# Patient Record
Sex: FEMALE | Race: WHITE | Employment: STUDENT | URBAN - METROPOLITAN AREA
[De-identification: names, ages, dates, MRNs, and addresses within clinical notes are randomized per-mention and may not be internally consistent; named-entity substitution may affect disease eponyms.]

---

## 2017-04-21 ENCOUNTER — OFFICE VISIT (OUTPATIENT)
Dept: INTERNAL MEDICINE CLINIC | Facility: CLINIC | Age: 22
End: 2017-04-21

## 2017-04-21 ENCOUNTER — LAB ENCOUNTER (OUTPATIENT)
Dept: LAB | Age: 22
End: 2017-04-21
Attending: INTERNAL MEDICINE
Payer: COMMERCIAL

## 2017-04-21 VITALS
SYSTOLIC BLOOD PRESSURE: 106 MMHG | DIASTOLIC BLOOD PRESSURE: 64 MMHG | BODY MASS INDEX: 18.85 KG/M2 | HEART RATE: 90 BPM | RESPIRATION RATE: 18 BRPM | OXYGEN SATURATION: 99 % | TEMPERATURE: 98 F | HEIGHT: 68.5 IN | WEIGHT: 125.81 LBS

## 2017-04-21 DIAGNOSIS — R53.83 OTHER FATIGUE: ICD-10-CM

## 2017-04-21 DIAGNOSIS — Z00.00 PHYSICAL EXAM, ANNUAL: ICD-10-CM

## 2017-04-21 DIAGNOSIS — L70.9 ACNE, UNSPECIFIED ACNE TYPE: ICD-10-CM

## 2017-04-21 DIAGNOSIS — Z01.419 VISIT FOR GYNECOLOGIC EXAMINATION: ICD-10-CM

## 2017-04-21 DIAGNOSIS — K59.00 CONSTIPATION, UNSPECIFIED CONSTIPATION TYPE: ICD-10-CM

## 2017-04-21 DIAGNOSIS — Z00.00 PHYSICAL EXAM, ANNUAL: Primary | ICD-10-CM

## 2017-04-21 DIAGNOSIS — D22.9 MULTIPLE NEVI: ICD-10-CM

## 2017-04-21 PROCEDURE — 80053 COMPREHEN METABOLIC PANEL: CPT

## 2017-04-21 PROCEDURE — 90715 TDAP VACCINE 7 YRS/> IM: CPT | Performed by: INTERNAL MEDICINE

## 2017-04-21 PROCEDURE — 90471 IMMUNIZATION ADMIN: CPT | Performed by: INTERNAL MEDICINE

## 2017-04-21 PROCEDURE — 36415 COLL VENOUS BLD VENIPUNCTURE: CPT

## 2017-04-21 PROCEDURE — 85025 COMPLETE CBC W/AUTO DIFF WBC: CPT

## 2017-04-21 PROCEDURE — 99395 PREV VISIT EST AGE 18-39: CPT | Performed by: INTERNAL MEDICINE

## 2017-04-21 PROCEDURE — 84443 ASSAY THYROID STIM HORMONE: CPT

## 2017-04-21 NOTE — PROGRESS NOTES
Luis Cintron is a 19year old female.     HPI:   Patient presents with:  Physical      20 y/o F here for physical exam; she reports acne has exacerbated, and she is using OTC oil-based cleansing agent only; she requests dermatology consultation for acne e bruising  Integumentary:  Negative for pruritus and rash  Neurological:  Negative for gait disturbance; negative for paresthesias   All other review of systems are negative.         PHYSICAL EXAM:   Blood pressure 106/64, pulse 90, temperature 97.9 °F (36.6 MD

## 2017-04-21 NOTE — PROGRESS NOTES
Pt presents for tdap injection. Full name and  verified Tdap 0.5ml administered IM to left deltoid. Tolerated well.

## 2017-04-25 ENCOUNTER — TELEPHONE (OUTPATIENT)
Dept: INTERNAL MEDICINE CLINIC | Facility: CLINIC | Age: 22
End: 2017-04-25

## 2017-05-19 ENCOUNTER — LAB REQUISITION (OUTPATIENT)
Dept: LAB | Facility: HOSPITAL | Age: 22
End: 2017-05-19
Payer: COMMERCIAL

## 2017-05-19 DIAGNOSIS — Z12.4 ENCOUNTER FOR SCREENING FOR MALIGNANT NEOPLASM OF CERVIX: ICD-10-CM

## 2017-05-19 DIAGNOSIS — Z11.3 ENCOUNTER FOR SCREENING FOR INFECTIONS WITH PREDOMINANTLY SEXUAL MODE OF TRANSMISSION: ICD-10-CM

## 2017-05-19 PROCEDURE — 88175 CYTOPATH C/V AUTO FLUID REDO: CPT | Performed by: OBSTETRICS & GYNECOLOGY

## 2017-05-19 PROCEDURE — 87491 CHLMYD TRACH DNA AMP PROBE: CPT | Performed by: OBSTETRICS & GYNECOLOGY

## 2017-05-19 PROCEDURE — 87591 N.GONORRHOEAE DNA AMP PROB: CPT

## 2017-05-19 PROCEDURE — 87491 CHLMYD TRACH DNA AMP PROBE: CPT

## 2017-05-19 PROCEDURE — 87591 N.GONORRHOEAE DNA AMP PROB: CPT | Performed by: OBSTETRICS & GYNECOLOGY

## 2017-06-12 ENCOUNTER — OFFICE VISIT (OUTPATIENT)
Dept: DERMATOLOGY CLINIC | Facility: CLINIC | Age: 22
End: 2017-06-12

## 2017-06-12 DIAGNOSIS — D23.5 BENIGN NEOPLASM OF SKIN OF TRUNK, EXCEPT SCROTUM: Primary | ICD-10-CM

## 2017-06-12 DIAGNOSIS — D18.01 HEMANGIOMA OF SKIN AND SUBCUTANEOUS TISSUE: ICD-10-CM

## 2017-06-12 DIAGNOSIS — D23.30 BENIGN NEOPLASM OF SKIN OF FACE: ICD-10-CM

## 2017-06-12 DIAGNOSIS — L70.0 ACNE VULGARIS: ICD-10-CM

## 2017-06-12 DIAGNOSIS — D23.60 BENIGN NEOPLASM OF SKIN OF UPPER LIMB, INCLUDING SHOULDER, UNSPECIFIED LATERALITY: ICD-10-CM

## 2017-06-12 DIAGNOSIS — D23.70 BENIGN NEOPLASM OF SKIN OF LOWER LIMB, INCLUDING HIP, UNSPECIFIED LATERALITY: ICD-10-CM

## 2017-06-12 DIAGNOSIS — D48.5 NEOPLASM OF UNCERTAIN BEHAVIOR OF SKIN: ICD-10-CM

## 2017-06-12 DIAGNOSIS — D23.4 BENIGN NEOPLASM OF SCALP AND SKIN OF NECK: ICD-10-CM

## 2017-06-12 PROCEDURE — 99203 OFFICE O/P NEW LOW 30 MIN: CPT | Performed by: DERMATOLOGY

## 2017-06-12 PROCEDURE — 88305 TISSUE EXAM BY PATHOLOGIST: CPT | Performed by: DERMATOLOGY

## 2017-06-12 PROCEDURE — 11100 BIOPSY OF SKIN LESION: CPT | Performed by: DERMATOLOGY

## 2017-06-12 RX ORDER — TRETINOIN 0.025 %
1 CREAM (GRAM) TOPICAL NIGHTLY
Qty: 30 G | Refills: 3 | Status: SHIPPED | OUTPATIENT
Start: 2017-06-12 | End: 2018-05-09

## 2017-06-12 RX ORDER — CLINDAMYCIN PHOSPHATE AND BENZOYL PEROXIDE 10; 50 MG/G; MG/G
1 GEL TOPICAL DAILY
Qty: 1 TUBE | Refills: 3 | Status: SHIPPED | OUTPATIENT
Start: 2017-06-12 | End: 2018-01-16

## 2017-06-12 RX ORDER — MINOCYCLINE HYDROCHLORIDE 100 MG/1
100 CAPSULE ORAL DAILY
Qty: 60 CAPSULE | Refills: 2 | Status: SHIPPED | OUTPATIENT
Start: 2017-06-12 | End: 2018-05-09

## 2017-06-12 NOTE — PROGRESS NOTES
HPI:     Chief Complaint     Moles;  Acne        HPI     Moles    Additional comments: 1st time LSS with referral for eval of multiple nevi (grandmother +)           Acne    Additional comments: with referral for tx of  acne face back and chest X 10 years, Comment:Other reaction(s): joint swelling             Other reaction(s): Hives / Skin Rash  Clavulanic Acid             Comment:Other reaction(s): Hives / Skin Rash  Penicillins                 Comment:Other reaction(s): Itching    Past Medical History   D asymmetry noted on dermoscopy. –Patient was concerned about a lesion on the outer upper  left arm. It is a 4 mm macule with slightly darker central pigment.   Appears symmetrical on dermoscopy  -there are scattered blotchy brown macules on face, trunk and encounter.          6/12/2017  Preethi Stoner

## 2017-08-16 ENCOUNTER — OFFICE VISIT (OUTPATIENT)
Dept: INTERNAL MEDICINE CLINIC | Facility: CLINIC | Age: 22
End: 2017-08-16

## 2017-08-16 VITALS
TEMPERATURE: 99 F | SYSTOLIC BLOOD PRESSURE: 94 MMHG | WEIGHT: 138.63 LBS | DIASTOLIC BLOOD PRESSURE: 66 MMHG | HEIGHT: 68.5 IN | HEART RATE: 64 BPM | BODY MASS INDEX: 20.77 KG/M2

## 2017-08-16 DIAGNOSIS — H10.9 CONJUNCTIVITIS OF LEFT EYE, UNSPECIFIED CONJUNCTIVITIS TYPE: Primary | ICD-10-CM

## 2017-08-16 PROCEDURE — 99212 OFFICE O/P EST SF 10 MIN: CPT | Performed by: INTERNAL MEDICINE

## 2017-08-16 PROCEDURE — 99213 OFFICE O/P EST LOW 20 MIN: CPT | Performed by: INTERNAL MEDICINE

## 2017-08-16 RX ORDER — GENTAMICIN SULFATE 3 MG/ML
1 SOLUTION/ DROPS OPHTHALMIC EVERY 4 HOURS
Qty: 5 ML | Refills: 0 | Status: SHIPPED | OUTPATIENT
Start: 2017-08-16 | End: 2018-01-16 | Stop reason: ALTCHOICE

## 2017-08-16 NOTE — PROGRESS NOTES
Sergio Villarreal is a 23year old female.     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         PHYSICAL EXAM:   Blood pressure 94/66, pulse 64, temperature 98.9 °F (37.2 °C), temperature source Oral

## 2018-01-15 ENCOUNTER — TELEPHONE (OUTPATIENT)
Dept: INTERNAL MEDICINE CLINIC | Facility: CLINIC | Age: 23
End: 2018-01-15

## 2018-01-15 NOTE — TELEPHONE ENCOUNTER
Please advise - called patient who states she vomited once yesterday with a littl eblood, has a migraine which is better now, achy all over , denies fever - did not take anything for her migraine - to DR. SHETH

## 2018-01-15 NOTE — TELEPHONE ENCOUNTER
Pt. Has not been feeling well since yesterday. She was vomiting yesterday, migraines, achey allover. Asking to see Dr. Jacinto Doherty. Today. Please advise. She can be reached at [phone].

## 2018-01-16 ENCOUNTER — OFFICE VISIT (OUTPATIENT)
Dept: INTERNAL MEDICINE CLINIC | Facility: CLINIC | Age: 23
End: 2018-01-16

## 2018-01-16 ENCOUNTER — LAB ENCOUNTER (OUTPATIENT)
Dept: LAB | Age: 23
End: 2018-01-16
Attending: INTERNAL MEDICINE
Payer: COMMERCIAL

## 2018-01-16 ENCOUNTER — TELEPHONE (OUTPATIENT)
Dept: INTERNAL MEDICINE CLINIC | Facility: CLINIC | Age: 23
End: 2018-01-16

## 2018-01-16 VITALS
TEMPERATURE: 98 F | BODY MASS INDEX: 21.24 KG/M2 | SYSTOLIC BLOOD PRESSURE: 90 MMHG | HEART RATE: 80 BPM | DIASTOLIC BLOOD PRESSURE: 64 MMHG | WEIGHT: 141.81 LBS | HEIGHT: 68.5 IN | OXYGEN SATURATION: 99 %

## 2018-01-16 DIAGNOSIS — K92.0 HEMATEMESIS, PRESENCE OF NAUSEA NOT SPECIFIED: ICD-10-CM

## 2018-01-16 DIAGNOSIS — K92.0 HEMATEMESIS, PRESENCE OF NAUSEA NOT SPECIFIED: Primary | ICD-10-CM

## 2018-01-16 LAB
ALBUMIN SERPL BCP-MCNC: 3.9 G/DL (ref 3.5–4.8)
ALBUMIN/GLOB SERPL: 1.3 {RATIO} (ref 1–2)
ALP SERPL-CCNC: 40 U/L (ref 32–100)
ALT SERPL-CCNC: 16 U/L (ref 14–54)
ANION GAP SERPL CALC-SCNC: 9 MMOL/L (ref 0–18)
AST SERPL-CCNC: 20 U/L (ref 15–41)
BASOPHILS # BLD: 0 K/UL (ref 0–0.2)
BASOPHILS NFR BLD: 0 %
BILIRUB SERPL-MCNC: 0.8 MG/DL (ref 0.3–1.2)
BUN SERPL-MCNC: 7 MG/DL (ref 8–20)
BUN/CREAT SERPL: 10.4 (ref 10–20)
CALCIUM SERPL-MCNC: 9 MG/DL (ref 8.5–10.5)
CHLORIDE SERPL-SCNC: 103 MMOL/L (ref 95–110)
CO2 SERPL-SCNC: 26 MMOL/L (ref 22–32)
CREAT SERPL-MCNC: 0.67 MG/DL (ref 0.5–1.5)
EOSINOPHIL # BLD: 0.1 K/UL (ref 0–0.7)
EOSINOPHIL NFR BLD: 1 %
ERYTHROCYTE [DISTWIDTH] IN BLOOD BY AUTOMATED COUNT: 12.6 % (ref 11–15)
GLOBULIN PLAS-MCNC: 3.1 G/DL (ref 2.5–3.7)
GLUCOSE SERPL-MCNC: 91 MG/DL (ref 70–99)
HCT VFR BLD AUTO: 38.3 % (ref 35–48)
HGB BLD-MCNC: 13.1 G/DL (ref 12–16)
LYMPHOCYTES # BLD: 1.3 K/UL (ref 1–4)
LYMPHOCYTES NFR BLD: 24 %
MCH RBC QN AUTO: 30.4 PG (ref 27–32)
MCHC RBC AUTO-ENTMCNC: 34.1 G/DL (ref 32–37)
MCV RBC AUTO: 89.1 FL (ref 80–100)
MONOCYTES # BLD: 0.6 K/UL (ref 0–1)
MONOCYTES NFR BLD: 11 %
NEUTROPHILS # BLD AUTO: 3.4 K/UL (ref 1.8–7.7)
NEUTROPHILS NFR BLD: 64 %
OSMOLALITY UR CALC.SUM OF ELEC: 284 MOSM/KG (ref 275–295)
PLATELET # BLD AUTO: 231 K/UL (ref 140–400)
PMV BLD AUTO: 7.4 FL (ref 7.4–10.3)
POTASSIUM SERPL-SCNC: 3.7 MMOL/L (ref 3.3–5.1)
PROT SERPL-MCNC: 7 G/DL (ref 5.9–8.4)
RBC # BLD AUTO: 4.3 M/UL (ref 3.7–5.4)
SODIUM SERPL-SCNC: 138 MMOL/L (ref 136–144)
WBC # BLD AUTO: 5.3 K/UL (ref 4–11)

## 2018-01-16 PROCEDURE — 80053 COMPREHEN METABOLIC PANEL: CPT

## 2018-01-16 PROCEDURE — 99214 OFFICE O/P EST MOD 30 MIN: CPT | Performed by: INTERNAL MEDICINE

## 2018-01-16 PROCEDURE — 36415 COLL VENOUS BLD VENIPUNCTURE: CPT

## 2018-01-16 PROCEDURE — 85025 COMPLETE CBC W/AUTO DIFF WBC: CPT

## 2018-01-16 PROCEDURE — 99212 OFFICE O/P EST SF 10 MIN: CPT | Performed by: INTERNAL MEDICINE

## 2018-01-16 RX ORDER — MINOCYCLINE HYDROCHLORIDE 100 MG/1
100 CAPSULE ORAL DAILY
Qty: 60 CAPSULE | Refills: 2 | Status: CANCELLED | OUTPATIENT
Start: 2018-01-16

## 2018-01-16 RX ORDER — CLINDAMYCIN PHOSPHATE AND BENZOYL PEROXIDE 10; 50 MG/G; MG/G
1 GEL TOPICAL DAILY
Qty: 1 TUBE | Refills: 3 | Status: SHIPPED | OUTPATIENT
Start: 2018-01-16 | End: 2018-05-09

## 2018-01-16 RX ORDER — OMEPRAZOLE 40 MG/1
40 CAPSULE, DELAYED RELEASE ORAL DAILY
Qty: 90 CAPSULE | Refills: 3 | Status: SHIPPED | OUTPATIENT
Start: 2018-01-16 | End: 2019-01-11

## 2018-01-16 NOTE — PROGRESS NOTES
Julian Winn is a 23year old female. HPI:   Patient presents with:  Vomiting: Pt vomited once on sunday with blood. Medication Request: refills pended. Per nursing documentation. Patient noted that Sunday she developed epigastric pain.   She the of urination  NEURO:  denies headaches or dizziness    EXAM:   BP 90/64 (BP Location: Left arm, Patient Position: Sitting, Cuff Size: adult)   Pulse 80   Temp 98.1 °F (36.7 °C) (Oral)   Ht 5' 8.5\" (1.74 m)   Wt 141 lb 12.8 oz (64.3 kg)   LMP 01/15/2018 (E weeks.     Deja Mills MD  1/16/2018  10:04 AM

## 2018-04-04 ENCOUNTER — LAB REQUISITION (OUTPATIENT)
Dept: LAB | Facility: HOSPITAL | Age: 23
End: 2018-04-04
Payer: COMMERCIAL

## 2018-04-04 DIAGNOSIS — Z11.3 ENCOUNTER FOR SCREENING FOR INFECTIONS WITH PREDOMINANTLY SEXUAL MODE OF TRANSMISSION: ICD-10-CM

## 2018-04-04 PROCEDURE — 87491 CHLMYD TRACH DNA AMP PROBE: CPT | Performed by: OBSTETRICS & GYNECOLOGY

## 2018-04-04 PROCEDURE — 87591 N.GONORRHOEAE DNA AMP PROB: CPT | Performed by: OBSTETRICS & GYNECOLOGY

## 2018-05-09 RX ORDER — MINOCYCLINE HYDROCHLORIDE 100 MG/1
100 CAPSULE ORAL DAILY
Qty: 60 CAPSULE | Refills: 2 | Status: SHIPPED | OUTPATIENT
Start: 2018-05-09 | End: 2019-03-05

## 2018-05-09 RX ORDER — CLINDAMYCIN PHOSPHATE AND BENZOYL PEROXIDE 10; 50 MG/G; MG/G
1 GEL TOPICAL DAILY
Qty: 1 TUBE | Refills: 3 | Status: SHIPPED | OUTPATIENT
Start: 2018-05-09 | End: 2019-03-05

## 2018-05-09 RX ORDER — TRETINOIN 0.025 %
1 CREAM (GRAM) TOPICAL NIGHTLY
Qty: 30 G | Refills: 3 | Status: SHIPPED | OUTPATIENT
Start: 2018-05-09 | End: 2019-03-05

## 2018-12-17 ENCOUNTER — TELEPHONE (OUTPATIENT)
Dept: INTERNAL MEDICINE CLINIC | Facility: CLINIC | Age: 23
End: 2018-12-17

## 2018-12-17 NOTE — TELEPHONE ENCOUNTER
As FYI to DR. SHETH - called patient who has trinh soon.  RN instructed her to call pediatrician for her immunization records - fax number to our office given

## 2018-12-17 NOTE — TELEPHONE ENCOUNTER
Pt is calling to check on which immunizations she has and has not had recently and which need to be renewed if any. 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 MMR vaccine x2 OR lab test showing immunity.    2. Chicken Pocks OR lab hannah

## 2019-01-07 ENCOUNTER — TELEPHONE (OUTPATIENT)
Dept: INTERNAL MEDICINE CLINIC | Facility: CLINIC | Age: 24
End: 2019-01-07

## 2019-01-07 DIAGNOSIS — Z78.9 MEASLES, MUMPS, RUBELLA (MMR) VACCINATION STATUS UNKNOWN: ICD-10-CM

## 2019-01-07 DIAGNOSIS — Z78.9 VARICELLA VACCINATION STATUS UNKNOWN: ICD-10-CM

## 2019-01-07 DIAGNOSIS — Z78.9 HEPATITIS B VACCINATION STATUS UNKNOWN: ICD-10-CM

## 2019-01-07 DIAGNOSIS — Z00.00 PHYSICAL EXAM, ANNUAL: Primary | ICD-10-CM

## 2019-01-07 NOTE — TELEPHONE ENCOUNTER
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

## 2019-01-07 NOTE — TELEPHONE ENCOUNTER
OK; I have placed orders for MMR titer, varicella titer, Hep B surface antibody; she may see nurse for TB skin test; if she needs 2-step PPD, she should present 1 week later for second PPD placement; does she have form that needs completion by physician?;

## 2019-01-08 NOTE — TELEPHONE ENCOUNTER
Spoke with patient to relay MD message and instructions. Patient verbalizes understanding of message and instructions, and was transferred to  to make a nurse visit appointment for TB test and flu shot.  Patient does not need a form signed from MD

## 2019-01-09 ENCOUNTER — NURSE ONLY (OUTPATIENT)
Dept: INTERNAL MEDICINE CLINIC | Facility: CLINIC | Age: 24
End: 2019-01-09

## 2019-01-09 DIAGNOSIS — Z23 NEED FOR TUBERCULOSIS VACCINATION: Primary | ICD-10-CM

## 2019-01-09 DIAGNOSIS — Z11.1 SCREENING FOR TUBERCULOSIS: ICD-10-CM

## 2019-01-09 LAB — INDURATION (): 0 MM (ref 0–11)

## 2019-01-09 PROCEDURE — 90686 IIV4 VACC NO PRSV 0.5 ML IM: CPT | Performed by: INTERNAL MEDICINE

## 2019-01-09 PROCEDURE — 90471 IMMUNIZATION ADMIN: CPT | Performed by: INTERNAL MEDICINE

## 2019-01-09 PROCEDURE — 86580 TB INTRADERMAL TEST: CPT | Performed by: INTERNAL MEDICINE

## 2019-01-09 NOTE — PROGRESS NOTES
TB skin test and flu written orders found in 19 telephone encounter. Verified name and . Patient tolerated well. Patient aware to come back in 48-72 hours for reading.

## 2019-01-09 NOTE — TELEPHONE ENCOUNTER
Spoke to patient to relay MD message.  Patient reports that she will be volunteering at [facility]. Per MD message, patient advised that a drug screening, as well as a quantiferon gold assay if needed, should be done through [facility] at the hospitals

## 2019-01-09 NOTE — TELEPHONE ENCOUNTER
it is an excessive requirement to require BOTH skin test for TB as well as quantiferon gold assay; quantiferon gold is not routinely done for screening due to cost; if she is volunteering at hospital, advise pt [facility]

## 2019-01-11 ENCOUNTER — APPOINTMENT (OUTPATIENT)
Dept: LAB | Age: 24
End: 2019-01-11
Attending: INTERNAL MEDICINE
Payer: COMMERCIAL

## 2019-01-11 ENCOUNTER — NURSE ONLY (OUTPATIENT)
Dept: INTERNAL MEDICINE CLINIC | Facility: CLINIC | Age: 24
End: 2019-01-11

## 2019-01-11 DIAGNOSIS — Z00.00 PHYSICAL EXAM, ANNUAL: ICD-10-CM

## 2019-01-11 DIAGNOSIS — Z78.9 VARICELLA VACCINATION STATUS UNKNOWN: ICD-10-CM

## 2019-01-11 DIAGNOSIS — Z78.9 MEASLES, MUMPS, RUBELLA (MMR) VACCINATION STATUS UNKNOWN: ICD-10-CM

## 2019-01-11 PROCEDURE — 86765 RUBEOLA ANTIBODY: CPT

## 2019-01-11 PROCEDURE — 36415 COLL VENOUS BLD VENIPUNCTURE: CPT

## 2019-01-11 PROCEDURE — 86787 VARICELLA-ZOSTER ANTIBODY: CPT

## 2019-01-14 LAB
MEV IGG SER-ACNC: >300 AU/ML (ref 30–?)
VZV IGG SER IA-ACNC: 1693 (ref 165–?)

## 2019-01-16 ENCOUNTER — NURSE ONLY (OUTPATIENT)
Dept: INTERNAL MEDICINE CLINIC | Facility: CLINIC | Age: 24
End: 2019-01-16

## 2019-01-16 LAB — INDURATION (): 0 MM (ref 0–11)

## 2019-01-16 PROCEDURE — 86580 TB INTRADERMAL TEST: CPT | Performed by: INTERNAL MEDICINE

## 2019-01-18 ENCOUNTER — NURSE ONLY (OUTPATIENT)
Dept: INTERNAL MEDICINE CLINIC | Facility: CLINIC | Age: 24
End: 2019-01-18

## 2019-01-18 NOTE — PROGRESS NOTES
Patient here for TB test read. Patient name and  verified. TB read negative, confirmed with Dr. Lisa Coleman.

## 2019-01-25 NOTE — TELEPHONE ENCOUNTER
TB results faxed to [facility] at  per her request. Patient notified and she verbalized understanding. Fax confirmation received.

## 2019-03-05 ENCOUNTER — OFFICE VISIT (OUTPATIENT)
Dept: INTERNAL MEDICINE CLINIC | Facility: CLINIC | Age: 24
End: 2019-03-05

## 2019-03-05 VITALS
HEIGHT: 68.5 IN | HEART RATE: 82 BPM | BODY MASS INDEX: 22.92 KG/M2 | DIASTOLIC BLOOD PRESSURE: 68 MMHG | OXYGEN SATURATION: 99 % | TEMPERATURE: 98 F | SYSTOLIC BLOOD PRESSURE: 92 MMHG | WEIGHT: 153 LBS

## 2019-03-05 DIAGNOSIS — J32.8 OTHER CHRONIC SINUSITIS: Primary | ICD-10-CM

## 2019-03-05 DIAGNOSIS — L70.0 ACNE VULGARIS: ICD-10-CM

## 2019-03-05 PROCEDURE — 99213 OFFICE O/P EST LOW 20 MIN: CPT | Performed by: INTERNAL MEDICINE

## 2019-03-05 RX ORDER — CLINDAMYCIN PHOSPHATE AND BENZOYL PEROXIDE 10; 50 MG/G; MG/G
1 GEL TOPICAL DAILY
Qty: 1 TUBE | Refills: 3 | Status: SHIPPED | OUTPATIENT
Start: 2019-03-05 | End: 2020-02-05 | Stop reason: ALTCHOICE

## 2019-03-05 RX ORDER — TRETINOIN 0.025 %
1 CREAM (GRAM) TOPICAL NIGHTLY
Qty: 30 G | Refills: 3 | Status: SHIPPED | OUTPATIENT
Start: 2019-03-05 | End: 2020-02-05 | Stop reason: ALTCHOICE

## 2019-03-05 RX ORDER — MINOCYCLINE HYDROCHLORIDE 100 MG/1
100 CAPSULE ORAL DAILY
Qty: 60 CAPSULE | Refills: 2 | Status: SHIPPED | OUTPATIENT
Start: 2019-03-05 | End: 2020-02-05 | Stop reason: ALTCHOICE

## 2019-03-05 NOTE — PROGRESS NOTES
HPI:    Patient ID: Justyn Gramajo is a 20year old female. Patient presents with complaints of bilateral ear fullness, left greater than right that started approximately 7-10 days ago. She also notes sinus and nasal congestion.   She denies any fevers External ear normal.   Nose: Right sinus exhibits no maxillary sinus tenderness and no frontal sinus tenderness. Left sinus exhibits no maxillary sinus tenderness and no frontal sinus tenderness.    Mouth/Throat: Oropharynx is clear and moist.   Neck: Neck

## 2019-08-05 ENCOUNTER — OFFICE VISIT (OUTPATIENT)
Dept: INTERNAL MEDICINE CLINIC | Facility: CLINIC | Age: 24
End: 2019-08-05
Payer: COMMERCIAL

## 2019-08-05 VITALS
TEMPERATURE: 98 F | HEIGHT: 68.5 IN | OXYGEN SATURATION: 99 % | WEIGHT: 154 LBS | BODY MASS INDEX: 23.07 KG/M2 | SYSTOLIC BLOOD PRESSURE: 100 MMHG | HEART RATE: 95 BPM | DIASTOLIC BLOOD PRESSURE: 76 MMHG

## 2019-08-05 DIAGNOSIS — B34.9 VIRAL SYNDROME: Primary | ICD-10-CM

## 2019-08-05 LAB
CONTROL LINE PRESENT WITH A CLEAR BACKGROUND (YES/NO): YES YES/NO
KIT LOT #: NORMAL NUMERIC
STREP GRP A CUL-SCR: NEGATIVE

## 2019-08-05 PROCEDURE — 99212 OFFICE O/P EST SF 10 MIN: CPT | Performed by: INTERNAL MEDICINE

## 2019-08-05 PROCEDURE — 87880 STREP A ASSAY W/OPTIC: CPT | Performed by: INTERNAL MEDICINE

## 2019-08-05 PROCEDURE — 99213 OFFICE O/P EST LOW 20 MIN: CPT | Performed by: INTERNAL MEDICINE

## 2019-08-05 NOTE — PROGRESS NOTES
Loreta Patel is a 30year old female. HPI:   Patient presents with:  Pharyngitis: body aches, chills x 4 days - sister dx wit Mono      As per nursing documentation. Patient states her sister was diagnosed with mono July 10.   She feels that her sister Voices no chest pain on exertion or shortness of breath  GI:   Voices no abdominal pain or changes of bowels   :Viices no urning or frequency of urination.   NEURO: She reports no focal symptoms per    EXAM:   /76   Pulse 95   Temp 98.3 °F (36.8 °C)

## 2020-01-06 ENCOUNTER — TELEPHONE (OUTPATIENT)
Dept: INTERNAL MEDICINE CLINIC | Facility: CLINIC | Age: 25
End: 2020-01-06

## 2020-01-06 RX ORDER — SULFAMETHOXAZOLE AND TRIMETHOPRIM 800; 160 MG/1; MG/1
1 TABLET ORAL 2 TIMES DAILY
Qty: 14 TABLET | Refills: 0 | Status: SHIPPED | OUTPATIENT
Start: 2020-01-06 | End: 2020-02-05 | Stop reason: ALTCHOICE

## 2020-01-06 NOTE — TELEPHONE ENCOUNTER
Spoke to patient. She states she has had symptoms for about 24 hours. She reports frequency, urgency, burning, and fever. Denies blood in urine and denies back pain.   I asked what her temp is and she stated she hadn't taken it, she just has been sweaty a

## 2020-01-06 NOTE — TELEPHONE ENCOUNTER
Imp- dysuria;  Rec- empiric Bactrim DS one tab po BID #14; ERx sent to [facility] in [place]; please call pt

## 2020-01-06 NOTE — TELEPHONE ENCOUNTER
Patient calling for antibiotic. Has UTI. Has had in them in the past. Feeling of frequent, urgency, burning and fever. Drinking water and cranberry juice. Not relieving discomfort. Has been lasting for 24 hours not getting any better.     [facility].

## 2020-02-05 ENCOUNTER — APPOINTMENT (OUTPATIENT)
Dept: LAB | Age: 25
End: 2020-02-05
Attending: INTERNAL MEDICINE
Payer: COMMERCIAL

## 2020-02-05 ENCOUNTER — OFFICE VISIT (OUTPATIENT)
Dept: INTERNAL MEDICINE CLINIC | Facility: CLINIC | Age: 25
End: 2020-02-05
Payer: COMMERCIAL

## 2020-02-05 VITALS
WEIGHT: 146 LBS | BODY MASS INDEX: 21.87 KG/M2 | HEART RATE: 77 BPM | OXYGEN SATURATION: 97 % | DIASTOLIC BLOOD PRESSURE: 68 MMHG | HEIGHT: 68.5 IN | TEMPERATURE: 98 F | SYSTOLIC BLOOD PRESSURE: 112 MMHG

## 2020-02-05 DIAGNOSIS — R53.83 OTHER FATIGUE: ICD-10-CM

## 2020-02-05 DIAGNOSIS — F41.9 ANXIETY: ICD-10-CM

## 2020-02-05 DIAGNOSIS — L70.9 ACNE, UNSPECIFIED ACNE TYPE: Primary | ICD-10-CM

## 2020-02-05 LAB
ALBUMIN SERPL-MCNC: 4.3 G/DL (ref 3.4–5)
ALBUMIN/GLOB SERPL: 1 {RATIO} (ref 1–2)
ALP LIVER SERPL-CCNC: 72 U/L (ref 37–98)
ALT SERPL-CCNC: 19 U/L (ref 13–56)
ANION GAP SERPL CALC-SCNC: 5 MMOL/L (ref 0–18)
AST SERPL-CCNC: 24 U/L (ref 15–37)
BILIRUB SERPL-MCNC: 0.3 MG/DL (ref 0.1–2)
BUN BLD-MCNC: 8 MG/DL (ref 7–18)
BUN/CREAT SERPL: 11.8 (ref 10–20)
CALCIUM BLD-MCNC: 9.9 MG/DL (ref 8.5–10.1)
CHLORIDE SERPL-SCNC: 105 MMOL/L (ref 98–112)
CO2 SERPL-SCNC: 29 MMOL/L (ref 21–32)
CREAT BLD-MCNC: 0.68 MG/DL (ref 0.55–1.02)
GLOBULIN PLAS-MCNC: 4.3 G/DL (ref 2.8–4.4)
GLUCOSE BLD-MCNC: 73 MG/DL (ref 70–99)
M PROTEIN MFR SERPL ELPH: 8.6 G/DL (ref 6.4–8.2)
OSMOLALITY SERPL CALC.SUM OF ELEC: 285 MOSM/KG (ref 275–295)
PATIENT FASTING Y/N/NP: NO
POTASSIUM SERPL-SCNC: 4 MMOL/L (ref 3.5–5.1)
SODIUM SERPL-SCNC: 139 MMOL/L (ref 136–145)

## 2020-02-05 PROCEDURE — 36415 COLL VENOUS BLD VENIPUNCTURE: CPT

## 2020-02-05 PROCEDURE — 99212 OFFICE O/P EST SF 10 MIN: CPT | Performed by: INTERNAL MEDICINE

## 2020-02-05 PROCEDURE — 99214 OFFICE O/P EST MOD 30 MIN: CPT | Performed by: INTERNAL MEDICINE

## 2020-02-05 PROCEDURE — 80053 COMPREHEN METABOLIC PANEL: CPT

## 2020-02-05 RX ORDER — ALPRAZOLAM 0.25 MG/1
0.25 TABLET ORAL 3 TIMES DAILY PRN
Qty: 42 TABLET | Refills: 2 | Status: SHIPPED | OUTPATIENT
Start: 2020-02-05

## 2020-02-05 RX ORDER — SPIRONOLACTONE 25 MG/1
25 TABLET ORAL DAILY
Qty: 30 TABLET | Refills: 5 | Status: SHIPPED | OUTPATIENT
Start: 2020-02-05 | End: 2020-07-02

## 2020-02-05 NOTE — PROGRESS NOTES
Angeles Euceda is a 30year old female.     HPI:   Patient presents with:  Checkup: Would like to try spironolactone for her face       31 y/o F with acne here for F/U; Uses coconut oil to face; does not use any antibiotic washes; has not responded to benzo Skin Rash  Penicillins                 Comment:Other reaction(s): Itching              ROS:   Constitutional: no weight loss; no fatigue  Cardiovascular:  Negative for chest pain; negative palpitations  Respiratory:  Negative for cough, dyspnea and wheezin vegetarian    History of DVT (deep venous thrombosis)  Was on coumadin from 10/11- 4/12 for DVT RLE; seen by hematologist Dr Pattie Chi  positive result per pt; pt advised she can no longer take OCP    Protein C deficiency  per pt; lab

## 2020-07-02 ENCOUNTER — TELEPHONE (OUTPATIENT)
Dept: INTERNAL MEDICINE CLINIC | Facility: CLINIC | Age: 25
End: 2020-07-02

## 2020-07-02 DIAGNOSIS — Z79.899 HIGH RISK MEDICATION USE: ICD-10-CM

## 2020-07-02 DIAGNOSIS — L70.0 ACNE VULGARIS: Primary | ICD-10-CM

## 2020-07-02 RX ORDER — SPIRONOLACTONE 25 MG/1
25 TABLET ORAL 2 TIMES DAILY
Qty: 60 TABLET | Refills: 5 | Status: SHIPPED | OUTPATIENT
Start: 2020-07-02

## 2020-07-02 NOTE — TELEPHONE ENCOUNTER
Left message for pt to call back. To Dr. Rory Swain - see request below. Pt was to return in 3 months per 2/5/20 office visit note. No appt scheduled at present.

## 2020-07-02 NOTE — TELEPHONE ENCOUNTER
Imp- acne; currently on spironolactone 25 mg po qD with mild lessening of acne    Rec- increase spironolactone to 25 mg po BID, #60, 5 RF; ERx sent; advise check BMP in 1 week    Pt called; pt verbalized understanding

## 2020-07-02 NOTE — TELEPHONE ENCOUNTER
Pt requests call back from Dr Landen Jarvis or nurse to advise on increasing her dose of Spironolactone to 100 mg    [phone]

## 2020-07-28 ENCOUNTER — TELEPHONE (OUTPATIENT)
Dept: INTERNAL MEDICINE CLINIC | Facility: CLINIC | Age: 25
End: 2020-07-28

## 2020-07-28 NOTE — TELEPHONE ENCOUNTER
Patient has been getting migraines lately  Dr Antonette Keith has been treating her for anxiety which is contributing to the migraines    She has missed a couple days of work lately so she needs a note for work stating she is being treated    Patient needs the note this week if possible, she is aware Dr Antonette Keith is out can the Dr on call write the note?     Patient would be able to pick it up in the office  Please call patient [phone]

## 2020-07-28 NOTE — TELEPHONE ENCOUNTER
Spoke with patient who reports she needs a note for work regarding days she missed. When asked which days she needs a note for she states it is just a general note that is needed saying she is being treated for migraines and anxiety and this is an ongoing medical issue. She denies any other symptoms such as cough, SOB, sore throat. She denies any contact with anyone known or presumed to have COVID, no travel recently. Advised patient come in to be seen. Explained Dr. Qamar Talley is out of office and she would need to be seen for another doctor to make this note. Patient states that due to her copay that is not an option for her right now. To Jing Sommers to advise on writing note for work. Patient states due to copay she is unable to come in to office for assessment.

## 2020-07-28 NOTE — TELEPHONE ENCOUNTER
Unfortunately no covering physician is going to feel comfortable with writing her note when they have never seen her, so another visit with the first available physician would be the only option unless Dr. Roseann Vogel where here in the office to do it himself, unfortunately is not this week, appears to be bad timing.

## 2020-08-04 NOTE — TELEPHONE ENCOUNTER
Called patient & advised appt is needed w/Dr O for a note  Pt asked to schedule a tele visit   - scheduled doximity appt for tomorrow at 5:30   Will doximity visit be ok for note?

## 2020-08-12 ENCOUNTER — TELEPHONE (OUTPATIENT)
Dept: INTERNAL MEDICINE CLINIC | Facility: CLINIC | Age: 25
End: 2020-08-12

## 2020-08-12 NOTE — TELEPHONE ENCOUNTER
Patient calling for work note. Employer is requesting note that patient gets migraines do to insomnia. Wants to be sure patient doesn't have Covid. Address note to  [address]  [employer].     Best number to contact patient at [phone]

## 2020-08-12 NOTE — TELEPHONE ENCOUNTER
I spoke with patient. She says she is calling for a note for work. She mentioned she also sent in a refill request today. 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  She has had these for

## 2020-08-13 NOTE — TELEPHONE ENCOUNTER
To  to please advise on refills-med started at last office visit (2/5/20) and patient was advised to RTC 3 months.

## 2025-03-18 ENCOUNTER — OFFICE VISIT (OUTPATIENT)
Dept: INTERNAL MEDICINE CLINIC | Facility: CLINIC | Age: 30
End: 2025-03-18

## 2025-03-18 ENCOUNTER — LAB ENCOUNTER (OUTPATIENT)
Dept: LAB | Age: 30
End: 2025-03-18
Attending: NURSE PRACTITIONER
Payer: COMMERCIAL

## 2025-03-18 VITALS
DIASTOLIC BLOOD PRESSURE: 71 MMHG | HEIGHT: 68 IN | RESPIRATION RATE: 16 BRPM | TEMPERATURE: 97 F | OXYGEN SATURATION: 98 % | SYSTOLIC BLOOD PRESSURE: 103 MMHG | WEIGHT: 146 LBS | BODY MASS INDEX: 22.13 KG/M2 | HEART RATE: 87 BPM

## 2025-03-18 DIAGNOSIS — R35.0 FREQUENCY OF URINATION: Primary | ICD-10-CM

## 2025-03-18 DIAGNOSIS — Z11.3 ROUTINE SCREENING FOR STI (SEXUALLY TRANSMITTED INFECTION): ICD-10-CM

## 2025-03-18 LAB
BILIRUBIN: NEGATIVE
GLUCOSE (URINE DIPSTICK): NEGATIVE MG/DL
KETONES (URINE DIPSTICK): NEGATIVE MG/DL
MULTISTIX LOT#: ABNORMAL NUMERIC
NITRITE, URINE: NEGATIVE
PH, URINE: 7.5 (ref 4.5–8)
PROTEIN (URINE DIPSTICK): NEGATIVE MG/DL
SPECIFIC GRAVITY: 1 (ref 1–1.03)
T PALLIDUM AB SER QL IA: NONREACTIVE
URINE-COLOR: YELLOW
UROBILINOGEN,SEMI-QN: 0.2 MG/DL (ref 0–1.9)

## 2025-03-18 PROCEDURE — 86780 TREPONEMA PALLIDUM: CPT

## 2025-03-18 PROCEDURE — 81002 URINALYSIS NONAUTO W/O SCOPE: CPT | Performed by: NURSE PRACTITIONER

## 2025-03-18 PROCEDURE — 99203 OFFICE O/P NEW LOW 30 MIN: CPT | Performed by: NURSE PRACTITIONER

## 2025-03-18 PROCEDURE — 36415 COLL VENOUS BLD VENIPUNCTURE: CPT

## 2025-03-18 RX ORDER — SULFAMETHOXAZOLE AND TRIMETHOPRIM 800; 160 MG/1; MG/1
1 TABLET ORAL 2 TIMES DAILY
Qty: 10 TABLET | Refills: 0 | Status: SHIPPED | OUTPATIENT
Start: 2025-03-18 | End: 2025-03-23

## 2025-03-18 RX ORDER — ENOXAPARIN SODIUM 100 MG/ML
1 INJECTION SUBCUTANEOUS
COMMUNITY
End: 2025-03-18 | Stop reason: ALTCHOICE

## 2025-03-18 NOTE — PROGRESS NOTES
Cami Mascorro is a 29 year old female.  Chief Complaint   Patient presents with    Urinary Frequency     With burning     HPI:   She presents with dysuria, urinary frequency and urinary urgency.     Symptoms started on Sunday. She has increased her water intake. She also starting drinking cranberry juice.     She tried AZO and it caused increased symptoms.     She would like to do STI testing. She had recent unprotected sex but has the same partner. No new partners. No vaginal discharge.     Current Outpatient Medications   Medication Sig Dispense Refill    sulfamethoxazole-trimethoprim -160 MG Oral Tab per tablet Take 1 tablet by mouth 2 (two) times daily for 5 days. 10 tablet 0    ALPRAZolam 0.25 MG Oral Tab Take 1 tablet (0.25 mg total) by mouth 3 (three) times daily as needed for Anxiety. 42 tablet 2      Past Medical History:    Acne    DVT of lower extremity (deep venous thrombosis) (HCC)    RLE. Coumadin x 6 mos; stopped 3/12    Factor V Leiden ([facility])    +      No past surgical history on file.   Social History:  Social History     Socioeconomic History    Marital status: Single   Tobacco Use    Smoking status: Never    Smokeless tobacco: Never   Vaping Use    Vaping status: Never Used   Substance and Sexual Activity    Alcohol use: Yes     Comment: 4-10 drinks a week    Drug use: No   Other Topics Concern    Caffeine Concern Yes     Comment: Coffee 1 cup daily    Pt has a pacemaker No    Pt has a defibrillator No    Reaction to local anesthetic No     Social Drivers of Health     Food Insecurity: No Food Insecurity (3/18/2025)    NCSS - Food Insecurity     Worried About Running Out of Food in the Last Year: No     Ran Out of Food in the Last Year: No   Transportation Needs: No Transportation Needs (3/18/2025)    NCSS - Transportation     Lack of Transportation: No   Housing Stability: Not At Risk (3/18/2025)    NCSS - Housing/Utilities     Has Housing: Yes     Worried About Losing Housing: No      Unable to Get Utilities: No      Family History   Problem Relation Age of Onset    Cancer Father     Basal Cell Maternal Grandmother         ?kind?      Allergies[1]     REVIEW OF SYSTEMS:     Review of Systems   Constitutional:  Negative for fever.   HENT: Negative.     Respiratory: Negative.  Negative for cough, shortness of breath and wheezing.    Cardiovascular:  Negative for chest pain.   Gastrointestinal:  Negative for abdominal pain.   Genitourinary:  Positive for dysuria, frequency and urgency. Negative for hematuria and vaginal discharge.   Musculoskeletal:  Negative for back pain.   Skin: Negative.    Neurological: Negative.    Psychiatric/Behavioral: Negative.        Wt Readings from Last 5 Encounters:   03/18/25 146 lb (66.2 kg)   02/05/20 146 lb (66.2 kg)   08/05/19 154 lb (69.9 kg)   03/05/19 153 lb (69.4 kg)   01/16/18 141 lb 12.8 oz (64.3 kg)     Body mass index is 22.2 kg/m².      EXAM:   /71 (BP Location: Right arm, Patient Position: Sitting, Cuff Size: adult)   Pulse 87   Temp 97.2 °F (36.2 °C) (Temporal)   Resp 16   Ht 5' 8\" (1.727 m)   Wt 146 lb (66.2 kg)   SpO2 98%   BMI 22.20 kg/m²     Physical Exam  Vitals reviewed.   Constitutional:       Appearance: Normal appearance.   HENT:      Head: Normocephalic.   Cardiovascular:      Rate and Rhythm: Normal rate and regular rhythm.      Pulses: Normal pulses.   Pulmonary:      Breath sounds: Normal breath sounds. No wheezing.   Musculoskeletal:         General: No swelling. Normal range of motion.   Skin:     General: Skin is warm and dry.   Neurological:      Mental Status: She is alert and oriented to person, place, and time.   Psychiatric:         Mood and Affect: Mood normal.         Behavior: Behavior normal.            ASSESSMENT AND PLAN:   1. Frequency of urination  - POC Urinalysis, Manual Dip without microscopy [[id number]]  - Urine Culture, Routine [E]; Future  - Urine Culture, Routine [E]  - sulfamethoxazole-trimethoprim DS  800-160 MG Oral Tab per tablet; Take 1 tablet by mouth 2 (two) times daily for 5 days.  Dispense: 10 tablet; Refill: 0    2. Routine screening for STI (sexually transmitted infection)  - refuses HIV and hepatitis testing  - no new partners  - she requesting screening  - Chlamydia/Gc Amplification; Future  - T Pallidum Screening Ada; Future      The patient indicates understanding of these issues and agrees to the plan.  Return for if symptoms do not resolve.       [1]   Allergies  Allergen Reactions    Amoxicillin      Other reaction(s): hives    Cefdinir      Other reaction(s): joint swelling  Other reaction(s): Hives / Skin Rash    Clavulanic Acid      Other reaction(s): Hives / Skin Rash    Penicillins      Other reaction(s): Itching

## 2025-03-19 ENCOUNTER — LAB ENCOUNTER (OUTPATIENT)
Dept: LAB | Age: 30
End: 2025-03-19
Attending: NURSE PRACTITIONER
Payer: COMMERCIAL

## 2025-03-19 DIAGNOSIS — Z11.3 ROUTINE SCREENING FOR STI (SEXUALLY TRANSMITTED INFECTION): ICD-10-CM

## 2025-03-19 PROCEDURE — 87591 N.GONORRHOEAE DNA AMP PROB: CPT

## 2025-03-19 PROCEDURE — 87491 CHLMYD TRACH DNA AMP PROBE: CPT

## 2025-03-20 LAB
C TRACH DNA SPEC QL NAA+PROBE: NEGATIVE
N GONORRHOEA DNA SPEC QL NAA+PROBE: NEGATIVE

## 2025-04-30 ENCOUNTER — OFFICE VISIT (OUTPATIENT)
Dept: OBGYN CLINIC | Facility: CLINIC | Age: 30
End: 2025-04-30
Payer: COMMERCIAL

## 2025-04-30 VITALS
WEIGHT: 144.63 LBS | BODY MASS INDEX: 21.92 KG/M2 | SYSTOLIC BLOOD PRESSURE: 116 MMHG | DIASTOLIC BLOOD PRESSURE: 79 MMHG | HEIGHT: 68 IN | HEART RATE: 76 BPM

## 2025-04-30 DIAGNOSIS — N89.8 VAGINAL ODOR: Primary | ICD-10-CM

## 2025-04-30 PROCEDURE — 99213 OFFICE O/P EST LOW 20 MIN: CPT | Performed by: ADVANCED PRACTICE MIDWIFE

## 2025-04-30 RX ORDER — CLINDAMYCIN PHOSPHATE 20 MG/G
1 CREAM VAGINAL NIGHTLY
Qty: 40 G | Refills: 0 | Status: SHIPPED | OUTPATIENT
Start: 2025-04-30 | End: 2025-05-07

## 2025-04-30 NOTE — PROGRESS NOTES
Chief Complaint   Patient presents with    Gyn Exam     Pt is here for Bv symptoms           HPI:   Patient presents with:  Gyn Exam: Vaginal odor  Cami Mascorro is a female who presents for vaginal odor  Uses Boric acid vaginal supplements frequently feels she always has a vaginal odor  IUD contraceptions  One male partner x 5 months  Declines STI testing    ONSET: chronic  PATTERN: worsening    REVIEW OF SYSTEMS:   ROS NEGATIVES: She denies fever, abnormal vaginal bleeding, vaginal itching, pelvic pain, urgency, back pain, and exposure to STD  ROS POSITIVES: She reports vaginal discharge, foul vaginal odor, and unprotected sex      EXAM:   /79 (BP Location: Right arm, Patient Position: Sitting, Cuff Size: adult)   Pulse 76   Ht 5' 8\" (1.727 m)   Wt 144 lb 9.6 oz (65.6 kg)   LMP  (LMP Unknown)   BMI 21.99 kg/m²   Estimated body mass index is 21.99 kg/m² as calculated from the following:    Height as of this encounter: 5' 8\" (1.727 m).    Weight as of this encounter: 144 lb 9.6 oz (65.6 kg). 5' 8\" (1.727 m)   Wt Readings from Last 6 Encounters:   04/30/25 144 lb 9.6 oz (65.6 kg)   03/18/25 146 lb (66.2 kg)   02/05/20 146 lb (66.2 kg)   08/05/19 154 lb (69.9 kg)   03/05/19 153 lb (69.4 kg)   01/16/18 141 lb 12.8 oz (64.3 kg)      General Appearance: awake, alert, oriented, in no acute distress  Abdomen:  deferred  Pelvic Exam: EGBUS within normal limits, vaginal discharge described as clear, foul, and vulvar erythema noted, normal cervix without lesions, polyps or tenderness.  IUD strings visulaized    Labs performed this visit:  No results found for this or any previous visit (from the past 10 hour(s)).    ASSESSMENT AND PLAN:     Diagnoses and all orders for this visit:    Vaginal odor  -     Vikor Vaginal ID; Future  -     clindamycin 2 % Vaginal Cream; Place 1 applicator vaginally nightly for 7 days.    Probable BV - treat    If recurrent BV consider suppressive treatment as well as partner treatment      No follow-ups on file.     Andie Rasmussen CNM   4/30/2025

## 2025-05-02 ENCOUNTER — TELEPHONE (OUTPATIENT)
Dept: OBGYN CLINIC | Facility: CLINIC | Age: 30
End: 2025-05-02

## 2025-05-02 NOTE — TELEPHONE ENCOUNTER
Pt verified name and .     Discussed Vikor lab results and recommended treatment with Clindamycin. Advised pt continue using clindamycin cream as directed for entire 7 day course for adequate treatment of infection. Pt verbalized understanding and agreed with plan.

## 2025-07-03 NOTE — PROGRESS NOTES
Past Medical History   Diagnosis Date   • Acne    • DVT of lower extremity (deep venous thrombosis) ([facility] [address]      RLE. Coumadin x 6 mos; stopped 3/12   • [facility]     +     No past surgical history on file.     Social History   Marital yes

## (undated) NOTE — LETTER
2020              Cami [address]acho. [facility] [address]        : [birth year]         To Whom it May Concern:     Tanya Fernandes is a patient who receives her medical care from this office.   She has a history of migraine

## (undated) NOTE — MR AVS SNAPSHOT
[place]  [facility] [address] [address]  [phone]               Thank you for choosing us for your health care visit with Katie Nance MD.  We are glad to serve you and happy to provide you with this summary of your visit.   [facility] Normal Orders This Visit    Ric [facility]n - INTERNAL [[id number] CUSTOM]  Order #:  [id number]         **REFERRAL REQUEST**    Your physician has referred you to a specialist.  Your physician or the clinic staff will provide you with the phone number you should call [place], [address] [facility], [address] [facility]  [phone]     [id number], [id number] [place] [address]   [address]  [facility],  [place]  [phone] TDAP       MyChart                  Visit [facility] online at  [email or web address]

## (undated) NOTE — MR AVS SNAPSHOT
[facility] [place] [address] [address] [phone]               Thank you for choosing us for your health care visit with Genny Roe MD.  We are glad to serve you and happy to provide you with this summary of y Other reaction(s): Itching                   Current Medications          This list is accurate as of: 6/12/17 12:26 PM.  Always use your most recent med list.                Clindamycin Phos-Benzoyl Perox 1.2-5 % Gel   Apply 1 Application topically daily